# Patient Record
Sex: MALE | Race: BLACK OR AFRICAN AMERICAN | NOT HISPANIC OR LATINO | URBAN - METROPOLITAN AREA
[De-identification: names, ages, dates, MRNs, and addresses within clinical notes are randomized per-mention and may not be internally consistent; named-entity substitution may affect disease eponyms.]

---

## 2017-10-12 ENCOUNTER — OUTPATIENT (OUTPATIENT)
Dept: OUTPATIENT SERVICES | Facility: HOSPITAL | Age: 57
LOS: 1 days | End: 2017-10-12
Payer: COMMERCIAL

## 2017-10-12 PROCEDURE — 71275 CT ANGIOGRAPHY CHEST: CPT

## 2017-10-12 PROCEDURE — 71275 CT ANGIOGRAPHY CHEST: CPT | Mod: 26

## 2017-10-12 PROCEDURE — 74174 CTA ABD&PLVS W/CONTRAST: CPT

## 2017-10-12 PROCEDURE — 74174 CTA ABD&PLVS W/CONTRAST: CPT | Mod: 26

## 2018-11-06 PROBLEM — Z00.00 ENCOUNTER FOR PREVENTIVE HEALTH EXAMINATION: Status: ACTIVE | Noted: 2018-11-06

## 2018-11-14 ENCOUNTER — APPOINTMENT (OUTPATIENT)
Dept: CT IMAGING | Facility: HOSPITAL | Age: 58
End: 2018-11-14
Payer: COMMERCIAL

## 2018-11-14 ENCOUNTER — OUTPATIENT (OUTPATIENT)
Dept: OUTPATIENT SERVICES | Facility: HOSPITAL | Age: 58
LOS: 1 days | End: 2018-11-14
Payer: COMMERCIAL

## 2018-11-14 PROCEDURE — 71275 CT ANGIOGRAPHY CHEST: CPT | Mod: 26

## 2018-11-14 PROCEDURE — 71275 CT ANGIOGRAPHY CHEST: CPT

## 2019-11-18 ENCOUNTER — TRANSCRIPTION ENCOUNTER (OUTPATIENT)
Age: 59
End: 2019-11-18

## 2019-11-18 ENCOUNTER — OUTPATIENT (OUTPATIENT)
Dept: OUTPATIENT SERVICES | Facility: HOSPITAL | Age: 59
LOS: 1 days | End: 2019-11-18
Payer: COMMERCIAL

## 2019-11-18 ENCOUNTER — APPOINTMENT (OUTPATIENT)
Dept: CT IMAGING | Facility: HOSPITAL | Age: 59
End: 2019-11-18
Payer: COMMERCIAL

## 2019-11-18 PROCEDURE — 71275 CT ANGIOGRAPHY CHEST: CPT | Mod: 26

## 2019-11-18 PROCEDURE — 71275 CT ANGIOGRAPHY CHEST: CPT

## 2019-12-01 ENCOUNTER — TRANSCRIPTION ENCOUNTER (OUTPATIENT)
Age: 59
End: 2019-12-01

## 2020-11-11 ENCOUNTER — TRANSCRIPTION ENCOUNTER (OUTPATIENT)
Age: 60
End: 2020-11-11

## 2021-06-24 ENCOUNTER — APPOINTMENT (OUTPATIENT)
Dept: NEPHROLOGY | Facility: CLINIC | Age: 61
End: 2021-06-24

## 2021-06-24 ENCOUNTER — APPOINTMENT (OUTPATIENT)
Dept: NEPHROLOGY | Facility: CLINIC | Age: 61
End: 2021-06-24
Payer: COMMERCIAL

## 2021-06-24 VITALS
WEIGHT: 275 LBS | HEIGHT: 71 IN | DIASTOLIC BLOOD PRESSURE: 65 MMHG | HEART RATE: 72 BPM | BODY MASS INDEX: 38.5 KG/M2 | SYSTOLIC BLOOD PRESSURE: 124 MMHG

## 2021-06-24 DIAGNOSIS — F17.200 NICOTINE DEPENDENCE, UNSPECIFIED, UNCOMPLICATED: ICD-10-CM

## 2021-06-24 DIAGNOSIS — D64.9 ANEMIA, UNSPECIFIED: ICD-10-CM

## 2021-06-24 DIAGNOSIS — E66.9 OBESITY, UNSPECIFIED: ICD-10-CM

## 2021-06-24 DIAGNOSIS — I70.1 ATHEROSCLEROSIS OF RENAL ARTERY: ICD-10-CM

## 2021-06-24 DIAGNOSIS — E11.9 TYPE 2 DIABETES MELLITUS W/OUT COMPLICATIONS: ICD-10-CM

## 2021-06-24 DIAGNOSIS — E03.9 HYPOTHYROIDISM, UNSPECIFIED: ICD-10-CM

## 2021-06-24 DIAGNOSIS — I10 ESSENTIAL (PRIMARY) HYPERTENSION: ICD-10-CM

## 2021-06-24 DIAGNOSIS — I87.2 VENOUS INSUFFICIENCY (CHRONIC) (PERIPHERAL): ICD-10-CM

## 2021-06-24 DIAGNOSIS — M10.9 GOUT, UNSPECIFIED: ICD-10-CM

## 2021-06-24 DIAGNOSIS — I50.30 UNSPECIFIED DIASTOLIC (CONGESTIVE) HEART FAILURE: ICD-10-CM

## 2021-06-24 DIAGNOSIS — E78.00 PURE HYPERCHOLESTEROLEMIA, UNSPECIFIED: ICD-10-CM

## 2021-06-24 PROCEDURE — 99204 OFFICE O/P NEW MOD 45 MIN: CPT

## 2021-06-24 RX ORDER — ATORVASTATIN CALCIUM 20 MG/1
20 TABLET, FILM COATED ORAL
Refills: 0 | Status: ACTIVE | COMMUNITY

## 2021-06-24 RX ORDER — LOSARTAN POTASSIUM 100 MG/1
100 TABLET, FILM COATED ORAL
Refills: 0 | Status: ACTIVE | COMMUNITY

## 2021-06-24 RX ORDER — METFORMIN ER 750 MG 750 MG/1
750 TABLET ORAL
Refills: 0 | Status: ACTIVE | COMMUNITY

## 2021-06-24 RX ORDER — CHLORTHALIDONE 25 MG/1
25 TABLET ORAL
Refills: 0 | Status: ACTIVE | COMMUNITY

## 2021-06-24 RX ORDER — WARFARIN 5 MG/1
5 TABLET ORAL
Refills: 0 | Status: ACTIVE | COMMUNITY

## 2021-06-24 RX ORDER — METOPROLOL TARTRATE 50 MG/1
50 TABLET, FILM COATED ORAL
Refills: 0 | Status: ACTIVE | COMMUNITY

## 2021-06-24 RX ORDER — FELODIPINE 10 MG/1
10 TABLET, EXTENDED RELEASE ORAL
Refills: 0 | Status: ACTIVE | COMMUNITY

## 2021-06-24 NOTE — PHYSICAL EXAM
[General Appearance - Alert] : alert [General Appearance - In No Acute Distress] : in no acute distress [Sclera] : the sclera and conjunctiva were normal [PERRL With Normal Accommodation] : pupils were equal in size, round, and reactive to light [Outer Ear] : the ears and nose were normal in appearance [Neck Appearance] : the appearance of the neck was normal [Neck Cervical Mass (___cm)] : no neck mass was observed [Jugular Venous Distention Increased] : there was no jugular-venous distention [Auscultation Breath Sounds / Voice Sounds] : lungs were clear to auscultation bilaterally [Heart Rate And Rhythm] : heart rate was normal and rhythm regular [Heart Sounds] : normal S1 and S2 [Heart Sounds Gallop] : no gallops [Murmurs] : no murmurs [Heart Sounds Pericardial Friction Rub] : no pericardial rub [Full Pulse] : the pedal pulses are present [Edema] : there was no peripheral edema [Skin Color & Pigmentation] : normal skin color and pigmentation [Skin Turgor] : normal skin turgor [] : no rash [Deep Tendon Reflexes (DTR)] : deep tendon reflexes were 2+ and symmetric [No Focal Deficits] : no focal deficits [Oriented To Time, Place, And Person] : oriented to person, place, and time [Impaired Insight] : insight and judgment were intact [Affect] : the affect was normal

## 2021-06-24 NOTE — REVIEW OF SYSTEMS
[SOB on Exertion] : shortness of breath during exertion [Diarrhea] : diarrhea [Negative] : Heme/Lymph [FreeTextEntry7] : lately

## 2021-06-24 NOTE — ASSESSMENT
[FreeTextEntry1] : 61-year-old man with resistant hypertension well controlled in recent years ; type 2 diabetes ; history of renal artery stenosis -no details available currently.  His renal function is normal, which is reassuring given his risk factors for renal disease.  He should remain on his current regimen, which is excellent and effective.  1 possible consideration is addition of an SGLT2 inhibitor in a patient who is at high cardiac and renal risk -several studies of SGLT 2 inhibitors in the last 2 years have solidified their role in cardiac and renal protection in patients like him.  If his BP becomes elevated, the appropriate add on medication would be spironolactone 25 mg daily but that does not appear to be necessary at present.

## 2021-06-24 NOTE — CONSULT LETTER
[Dear  ___] : Dear  [unfilled], [Consult Letter:] : I had the pleasure of evaluating your patient, [unfilled]. [Please see my note below.] : Please see my note below. [Consult Closing:] : Thank you very much for allowing me to participate in the care of this patient.  If you have any questions, please do not hesitate to contact me. [Sincerely,] : Sincerely, [FreeTextEntry2] : Rosana Finley ACP [FreeTextEntry3] : Sincerely, \par \par Issac Renee MD, FACP

## 2021-06-24 NOTE — HISTORY OF PRESENT ILLNESS
[FreeTextEntry1] : 61-year-old man with a history of resistant hypertension, type 2 diabetes, hyperlipidemia, diastolic CHF, hypothyroidism, obesity, 30-pack-year smoking history, and renal artery stenosis diagnosed on CTA in 2017.  I do not yet know the details and I am awaiting a fax of his CTA from  [facility] cardiology.  His renal function is normal with a creatinine of 0.96 and GFR of 97.  His K was 4.3 last month with a BUN of 26 and CO2 of 26, hemoglobin 12.4.  His BP has been well controlled for several years, and was 129/84 last month.  He can walk about 4-5 blocks before having to rest and notes slight increase in ALDRIDGE with stairs lately.  Cardiac work-up recently for that was negative.  He is now being evaluated in regard to anemia as a possible cause of dyspnea, and he will undergo colonoscopy very shortly.  He does snore but his wife does not report any apneic episodes and he has not been formally tested for sleep apnea.  His BP is nicely controlled with 4 agents, felodipine 10 mg daily, losartan 100 mg daily, metoprolol 50 mg daily, and chlorthalidone 25 mg daily.